# Patient Record
Sex: FEMALE | Race: WHITE
[De-identification: names, ages, dates, MRNs, and addresses within clinical notes are randomized per-mention and may not be internally consistent; named-entity substitution may affect disease eponyms.]

---

## 2019-08-23 NOTE — RAD
RIGHT HIP 2 VIEWS:

 

Date:  08/23/19 

 

HISTORY:  

Pain. 

 

COMPARISON:  

None. 

 

FINDINGS:

Mild loss of joint space height. Contour of the femoral head is maintained. No fracture. 

 

IMPRESSION: 

Mild loss of joint space height. 

 

 

POS: [facility]

## 2019-08-23 NOTE — RAD
LEFT HIP 2 VIEWS:

 

Date:  08/23/19 

 

HISTORY:  

Pain. 

 

FINDINGS:

Mild loss of joint space height. Contour of the femoral head is maintained. No fracture. 

 

IMPRESSION: 

Mild loss of joint space height. 

 

 

POS: [facility]

## 2019-08-23 NOTE — RAD
RIGHT WRIST 3 VIEWS:

 

Date:  08/23/19 

 

HISTORY:  

Pain. Osteoarthritis. 

 

FINDINGS:

Intercarpal and radiocarpal joint spaces are preserved. No fracture. No cortical irregularity or dee
osteal reaction. Ulnar styloid and radial styloid are intact. 

 

IMPRESSION: 

Unremarkable 3 views right wrist. 

 

 

POS: [facility]

## 2019-08-23 NOTE — RAD
THREE VIEWS LEFT HAND:

 

HISTORY: 

Pain.

 

COMPARISON: 

None.

 

FINDINGS: 

Moderate degenerative change involving the distal interphalangeal joint space of the 2nd digit.  Ther
e is associated deformity.  The remaining joint spaces do not have any significant deformity.  No fra
cture or cortical irregularity.  No erosive changes of the radial or ulnar styloids.

 

IMPRESSION: 

Degenerative changes involving the distal interphalangeal joint space of the 1st digit.

 

POS: [facility]

## 2019-08-23 NOTE — RAD
3 VIEWS LEFT WRIST:

 

Date:  08/23/19 

 

HISTORY:  

Pain. Evaluate for osteoarthritis. 

 

FINDINGS:

Intercarpal and radiocarpal joint space preserved. No fracture. No cortical irregularity or periostea
l reaction. Ulnar styloid and radial styloid are preserved. 

 

IMPRESSION: 

Unremarkable 3 views left wrist. 

 

 

POS: [facility]

## 2019-08-23 NOTE — RAD
RIGHT HAND 3 VIEWS:

 

HISTORY: 

Osteoarthritis.  Pain.

 

FINDINGS: 

There is moderate degenerative change involving the distal interphalangeal joint space of the 2nd dig
it.  There is mild to moderate degenerative change involving the distal interphalangeal joint space o
f the 3rd digit as well as the proximal interphalangeal joint space of the 3rd digit.  Mild to modera
te degenerative change involving the distal interphalangeal joint space of the 5th digit.  No erosive
 or destructive changes.  No fracture.  No cortical irregularity.  Ulnar styloid and renal styloid ar
e intact.

 

IMPRESSION: 

Degenerative changes of the right hand involving multiple interphalangeal joint spaces as above.

 

POS: [facility]

## 2019-09-05 ENCOUNTER — HOSPITAL ENCOUNTER (OUTPATIENT)
Dept: HOSPITAL 92 - BICMAMMO | Age: 69
Discharge: HOME | End: 2019-09-05
Attending: INTERNAL MEDICINE
Payer: MEDICARE

## 2019-09-05 DIAGNOSIS — M85.89: ICD-10-CM

## 2019-09-05 DIAGNOSIS — Z80.3: ICD-10-CM

## 2019-09-05 DIAGNOSIS — M19.90: ICD-10-CM

## 2019-09-05 DIAGNOSIS — Z13.820: ICD-10-CM

## 2019-09-05 DIAGNOSIS — Z12.31: Primary | ICD-10-CM

## 2019-09-05 PROCEDURE — 77067 SCR MAMMO BI INCL CAD: CPT

## 2019-09-05 PROCEDURE — 77063 BREAST TOMOSYNTHESIS BI: CPT

## 2019-09-05 PROCEDURE — 77080 DXA BONE DENSITY AXIAL: CPT

## 2019-09-05 NOTE — BD
EXAM: DEXA bone density examination



HISTORY: 69-year-old postmenopausal female for screening



COMPARISON: None



FINDINGS:

L1--bone mineral density 0.904 g/sq cm; T score -0.8

L2--bone mineral density 0.849 g/sq cm; T score -1.6

L3--bone mineral density 0.928 g/sq cm; T score -1.4

L4--bone mineral density 0.922 g/sq cm; T score -1.3

Total L1-L4--bone mineral density 0.902 g/sq cm; T score -1.3



Left femoral neck--bone mineral density0.674; T score -1.6

Total proximal left femur--bone mineral density 0.946; T score 0.0



IMPRESSION: Osteopenia This patient has a 10 year WHO fracture risk of a major osteoporotic fracture 
of 9.4% and of a hip fracture of 1.3%.



Reported By: Boston Quarles 

Electronically Signed:  9/5/2019 10:11 AM

## 2019-09-18 NOTE — MMO
Bilateral MAMMO Bilat Screen [facility].

 

CLINICAL HISTORY:

Patient is 69 years old and is seen for screening. The patient has the following

family history of breast cancer:  mother, at age 40.  The patient has no

personal history of cancer. The patient has a history of needle biopsy in YEARS

AGO - benign - UNK SIDE.

 

VIEWS:

The views performed were:  bilateral craniocaudal with tomosynthesis and

bilateral mediolateral oblique with tomosynthesis.

 

FILMS COMPARED:

The present examination has been compared to prior imaging studies performed at

[facility] on 02/20/2013, 04/09/2015, 08/09/2016 and

03/07/2018.

 

This study has been interpreted with the assistance of computer-aided detection.

 

MAMMOGRAM FINDINGS:

The breasts are heterogeneously dense, which could obscure a lesion on

mammography.

 

Benign calcifications are noted bilaterally.

 

There are no suspicious masses, suspicious calcifications, or new areas of

architectural distortion.

 

IMPRESSION:

THERE IS NO MAMMOGRAPHIC EVIDENCE OF MALIGNANCY.

 

A ROUTINE FOLLOW-UP MAMMOGRAM IN 1 YEAR IS RECOMMENDED.

 

THE RESULTS OF THIS EXAM WERE SENT TO THE PATIENT.

 

ACR BI-RADS Category 2 - Benign finding

 

MAMMOGRAPHY NOTE:

 1. A negative mammogram report should not delay a biopsy if a dominant of

 clinically suspicious mass is present.

 2. Approximately 10% to 15% of breast cancers are not detected by

 mammography.

 3. Adenosis and dense breasts may obscure an underlying neoplasm.

 

 

Reported by: ARNOL FABIAN MD

Electonically Signed: [id number]

## 2023-04-25 ENCOUNTER — HOSPITAL ENCOUNTER (OUTPATIENT)
Dept: HOSPITAL 92 - CSHMAMMO | Age: 73
Discharge: HOME | End: 2023-04-25
Attending: INTERNAL MEDICINE
Payer: MEDICARE

## 2023-04-25 DIAGNOSIS — Q83.9: ICD-10-CM

## 2023-04-25 DIAGNOSIS — Z12.31: Primary | ICD-10-CM

## 2023-04-25 DIAGNOSIS — Z80.3: ICD-10-CM

## 2023-04-25 PROCEDURE — 77067 SCR MAMMO BI INCL CAD: CPT

## 2023-04-25 PROCEDURE — 77063 BREAST TOMOSYNTHESIS BI: CPT
